# Patient Record
Sex: FEMALE | Race: WHITE
[De-identification: names, ages, dates, MRNs, and addresses within clinical notes are randomized per-mention and may not be internally consistent; named-entity substitution may affect disease eponyms.]

---

## 2017-05-15 ENCOUNTER — HOSPITAL ENCOUNTER (EMERGENCY)
Dept: HOSPITAL 62 - ER | Age: 27
Discharge: HOME | End: 2017-05-15
Payer: SELF-PAY

## 2017-05-15 VITALS — SYSTOLIC BLOOD PRESSURE: 122 MMHG | DIASTOLIC BLOOD PRESSURE: 82 MMHG

## 2017-05-15 DIAGNOSIS — F17.200: ICD-10-CM

## 2017-05-15 DIAGNOSIS — R11.2: Primary | ICD-10-CM

## 2017-05-15 DIAGNOSIS — R19.7: ICD-10-CM

## 2017-05-15 DIAGNOSIS — R10.9: ICD-10-CM

## 2017-05-15 LAB
ALBUMIN SERPL-MCNC: 4.4 G/DL (ref 3.5–5)
ALP SERPL-CCNC: 67 U/L (ref 38–126)
ALT SERPL-CCNC: 46 U/L (ref 9–52)
ANION GAP SERPL CALC-SCNC: 10 MMOL/L (ref 5–19)
APPEARANCE UR: (no result)
AST SERPL-CCNC: 26 U/L (ref 14–36)
BASOPHILS # BLD AUTO: 0 10^3/UL (ref 0–0.2)
BASOPHILS NFR BLD AUTO: 0.7 % (ref 0–2)
BILIRUB DIRECT SERPL-MCNC: 0.4 MG/DL (ref 0–0.4)
BILIRUB SERPL-MCNC: 0.9 MG/DL (ref 0.2–1.3)
BILIRUB UR QL STRIP: NEGATIVE
BUN SERPL-MCNC: 18 MG/DL (ref 7–20)
CALCIUM: 9.4 MG/DL (ref 8.4–10.2)
CHLORIDE SERPL-SCNC: 103 MMOL/L (ref 98–107)
CO2 SERPL-SCNC: 27 MMOL/L (ref 22–30)
CREAT SERPL-MCNC: 0.71 MG/DL (ref 0.52–1.25)
EOSINOPHIL # BLD AUTO: 0.3 10^3/UL (ref 0–0.6)
EOSINOPHIL NFR BLD AUTO: 6 % (ref 0–6)
ERYTHROCYTE [DISTWIDTH] IN BLOOD BY AUTOMATED COUNT: 12.9 % (ref 11.5–14)
GLUCOSE SERPL-MCNC: 141 MG/DL (ref 75–110)
GLUCOSE UR STRIP-MCNC: NEGATIVE MG/DL
HCT VFR BLD CALC: 43.5 % (ref 36–47)
HGB BLD-MCNC: 14.6 G/DL (ref 12–15.5)
HGB HCT DIFFERENCE: 0.3
KETONES UR STRIP-MCNC: NEGATIVE MG/DL
LIPASE SERPL-CCNC: 43.5 U/L (ref 23–300)
LYMPHOCYTES # BLD AUTO: 1.2 10^3/UL (ref 0.5–4.7)
LYMPHOCYTES NFR BLD AUTO: 26.8 % (ref 13–45)
MCH RBC QN AUTO: 31.2 PG (ref 27–33.4)
MCHC RBC AUTO-ENTMCNC: 33.5 G/DL (ref 32–36)
MCV RBC AUTO: 93 FL (ref 80–97)
MONOCYTES # BLD AUTO: 0.5 10^3/UL (ref 0.1–1.4)
MONOCYTES NFR BLD AUTO: 12.1 % (ref 3–13)
NEUTROPHILS # BLD AUTO: 2.5 10^3/UL (ref 1.7–8.2)
NEUTS SEG NFR BLD AUTO: 54.4 % (ref 42–78)
NITRITE UR QL STRIP: NEGATIVE
PH UR STRIP: 5 [PH] (ref 5–9)
POTASSIUM SERPL-SCNC: 3.9 MMOL/L (ref 3.6–5)
PROT SERPL-MCNC: 7.4 G/DL (ref 6.3–8.2)
PROT UR STRIP-MCNC: 30 MG/DL
RBC # BLD AUTO: 4.67 10^6/UL (ref 3.72–5.28)
SODIUM SERPL-SCNC: 140 MMOL/L (ref 137–145)
SP GR UR STRIP: 1.03
UROBILINOGEN UR-MCNC: NEGATIVE MG/DL (ref ?–2)
WBC # BLD AUTO: 4.5 10^3/UL (ref 4–10.5)

## 2017-05-15 PROCEDURE — 83690 ASSAY OF LIPASE: CPT

## 2017-05-15 PROCEDURE — S0119 ONDANSETRON 4 MG: HCPCS

## 2017-05-15 PROCEDURE — 85025 COMPLETE CBC W/AUTO DIFF WBC: CPT

## 2017-05-15 PROCEDURE — 81001 URINALYSIS AUTO W/SCOPE: CPT

## 2017-05-15 PROCEDURE — 80053 COMPREHEN METABOLIC PANEL: CPT

## 2017-05-15 PROCEDURE — 99284 EMERGENCY DEPT VISIT MOD MDM: CPT

## 2017-05-15 PROCEDURE — 87086 URINE CULTURE/COLONY COUNT: CPT

## 2017-05-15 PROCEDURE — 84703 CHORIONIC GONADOTROPIN ASSAY: CPT

## 2017-05-15 PROCEDURE — 36415 COLL VENOUS BLD VENIPUNCTURE: CPT

## 2017-05-15 NOTE — ER DOCUMENT REPORT
ED GI/





- General


Chief Complaint: Nausea/Vomiting/Diarrhea


Stated Complaint: ABDOMINAL PAIN/VOMITING


Time Seen by Provider: 05/15/17 08:06


Mode of Arrival: Ambulatory


Information source: Patient


Notes: 


26-year-old female with nausea vomiting and diarrhea since Saturday.  She feels 

dehydrated and vomited at work so they sent her home.   No fever or chills.  

Generalized mild abdominal pain and no focal tenderness at this time.  No 

history of Crohn's, colitis, diverticulitis.  Denies pregnancy.


TRAVEL OUTSIDE OF THE [place] IN LAST 30 DAYS: No





- Related Data


Allergies/Adverse Reactions: 


 





No Known Allergies Allergy (Unverified 05/15/17 07:48)


 











Past Medical History





- General


Information source: Patient





- Social History


Smoking Status: Current Every Day Smoker


Chew tobacco use (# tins/day): No


Frequency of alcohol use: Occasional


Drug Abuse: None


Lives with: Family


Family History: Reviewed & Not Pertinent


Patient has suicidal ideation: No


Patient has homicidal ideation: No





- Medical History


Medical History: Negative


Renal/ Medical History: Denies: Hx Peritoneal Dialysis


Surgical Hx: Negative





Review of Systems





- Review of Systems


Constitutional: No symptoms reported


EENT: No symptoms reported


Cardiovascular: No symptoms reported


Respiratory: No symptoms reported


Gastrointestinal: See HPI


Genitourinary: No symptoms reported


Female Genitourinary: No symptoms reported


Musculoskeletal: No symptoms reported


Skin: No symptoms reported


Hematologic/Lymphatic: No symptoms reported


Neurological/Psychological: No symptoms reported





Physical Exam





- Vital signs


Vitals: 


 











Temp Pulse Resp BP Pulse Ox


 


 97.7 F   97   16   137/77 H  97 


 


 05/15/17 07:48  05/15/17 07:48  05/15/17 07:48  05/15/17 07:48  05/15/17 07:48











Interpretation: Normal





- General


General appearance: Appears well, Alert


In distress: None





- HEENT


Head: Normocephalic, Atraumatic


Eyes: Normal


Pupils: PERRL


Mucous membranes: Dry


Neck: Supple.  No: Lymphadenopathy





- Respiratory


Respiratory status: No respiratory distress


Chest status: Nontender


Breath sounds: Normal


Chest palpation: Normal





- Cardiovascular


Rhythm: Regular


Heart sounds: Normal auscultation


Murmur: No





- Abdominal


Inspection: Normal


Distension: No distension


Bowel sounds: Normal


Tenderness: Nontender.  No: Tender


Organomegaly: No organomegaly





- Back


Back: Normal, Nontender.  No: CVA tenderness





- Extremities


General upper extremity: Normal inspection, Nontender, Normal color, Normal ROM

, Normal temperature


General lower extremity: Normal inspection, Nontender, Normal color, Normal ROM

, Normal temperature, Normal weight bearing.  No: Sapna's sign





- Neurological


Neuro grossly intact: Yes


Cognition: Normal


Orientation: AAOx4


Cliffwood Coma Scale Eye Opening: Spontaneous


[place] Coma Scale Verbal: Oriented


[place] Coma Scale Motor: Obeys Commands


[place] Coma Scale Total: 15


Speech: Normal


Motor strength normal: LUE, RUE, LLE, RLE


Sensory: Normal





- Psychological


Associated symptoms: Normal affect, Normal mood





- Skin


Skin Temperature: Warm


Skin Moisture: Dry


Skin Color: Normal


Skin irregularity: negative: Rash





Course





- Re-evaluation


Re-evalutation: 





05/15/17 19:01


Late entry: Patient felt much better after 2 L of fluid and she was able to 

drink fluids and eat crackers.  sHe wants to return to work on Wednesday.





- Vital Signs


Vital signs: 


 











Temp Pulse Resp BP Pulse Ox


 


 97.4 F   82   18   122/82   98 


 


 05/15/17 10:55  05/15/17 10:55  05/15/17 10:55  05/15/17 10:55  05/15/17 10:55














- Laboratory


Result Diagrams: 


 05/15/17 07:50





 05/15/17 07:50


Laboratory results interpreted by me: 


 











  05/15/17 05/15/17





  07:50 07:50


 


Glucose  141 H 


 


Urine Protein   30 H














Discharge





- Discharge


Clinical Impression: 


 vomiting and diarrhea





Condition: Good


Disposition: HOME, SELF-CARE


Instructions:  Antinausea Medication ([facility]), Intravenous (IV) Fluids ([facility]), 

Vomiting ([facility]), Diarrhea, Nonspecific ([facility])


Additional Instructions: 


Continue to hydrate and advance her diet as tolerated


Return to the emergency room if worse





Please complete the patient satisfaction survey if you get one, and return it.. 

If you do not receive a survey,  then you can go to the [facility] website, [email or web address] 

and place your comments about your very good care. Thank you very much. It was 

a pleasure being your medical provider today.


Forms:  Return to Work

## 2017-07-05 ENCOUNTER — HOSPITAL ENCOUNTER (EMERGENCY)
Dept: HOSPITAL 62 - ER | Age: 27
Discharge: HOME | End: 2017-07-05
Payer: SELF-PAY

## 2017-07-05 VITALS — SYSTOLIC BLOOD PRESSURE: 119 MMHG | DIASTOLIC BLOOD PRESSURE: 68 MMHG

## 2017-07-05 DIAGNOSIS — R22.0: Primary | ICD-10-CM

## 2017-07-05 PROCEDURE — 99283 EMERGENCY DEPT VISIT LOW MDM: CPT

## 2017-07-05 NOTE — ER DOCUMENT REPORT
HPI





- HPI


Patient complains to provider of: facial swelling


Pain Level: 5


Context: 


27 yo female c/o left facial swelling since last night.  denies new contacts or 

foods, no dental pain or gum swelling.  no facial pain but cheek feels numb.  

denies tongue swelling.  no difficulty swallowing, talking or breathing


Associated Symptoms: None


Exacerbated by: Denies


Relieved by: Denies


Similar symptoms previously: No


Recently seen / treated by doctor: No





- ROS


Systems Reviewed and Negative: Yes All other systems reviewed and negative





- DERM


Skin Color: Normal





Past Medical History





- General


Information source: Patient





- Social History


Smoking Status: Never Smoker


Frequency of alcohol use: None


Drug Abuse: None


Lives with: Family


Family History: Reviewed & Not Pertinent


Patient has suicidal ideation: No


Patient has homicidal ideation: No





- Medical History


Medical History: Negative


Renal/ Medical History: Denies: Hx Peritoneal Dialysis





Vertical Provider Document





- CONSTITUTIONAL


Agree With Documented VS: Yes


Exam Limitations: No Limitations





- INFECTION CONTROL


TRAVEL OUTSIDE OF THE [place] IN LAST 30 DAYS: No





- HEENT


HEENT: Atraumatic, PERRLA


Notes: 


+ soft tissue swelling to left malar face.  no angioedema, no gingival edema or 

pain





- NECK


Neck: Normal Inspection, Supple





- RESPIRATORY


O2 Sat by Pulse Oximetry: 97





- CARDIOVASCULAR


Cardiovascular: Regular Rate, Regular Rhythm





- MUSCULOSKELETAL/EXTREMETIES


Musculoskeletal/Extremeties: MAEW, FROM, Non-Tender





- NEURO


Level of Consciousness: Awake, Alert, Appropriate





- DERM


Integumentary: Warm, Dry, No Rash





Course





- Re-evaluation


Re-evalutation: 





07/05/17 13:50


swelling looks more consistant with allergic reaction than infectious cause.  

will treat with steroid, antihistamines.  pt instructed to return to ER for any 

worsening.  agreeable with plan and stable for discharge





- Vital Signs


Vital signs: 


 











Temp Pulse Resp BP Pulse Ox


 


 98.5 F   73   14   129/68 H  97 


 


 07/05/17 12:20  07/05/17 12:20  07/05/17 12:20  07/05/17 12:20  07/05/17 12:20














Discharge





- Discharge


Clinical Impression: 


 Facial swelling





Condition: Stable


Disposition: HOME, SELF-CARE


Instructions:  Acute Allergic Reaction ([facility]), Steroid Medication, OTC 

Antihistamines ([facility]), Use of Diphenhydramine


Additional Instructions: 


I am treating your symptoms for an allergic reaction


please take medications as prescribed


take OTC Benadryl 50mg every 6h for 24h after symptoms resolve


take OTC Pepcid twice a day for 24h after symptoms resolve


return to ER for any worsening


Prescriptions: 


Prednisone [Deltasone 20 mg Tablet] 3 tab PO DAILY 5 Days


Forms:  Return to Work

## 2018-05-06 ENCOUNTER — HOSPITAL ENCOUNTER (INPATIENT)
Dept: HOSPITAL 62 - ER | Age: 28
LOS: 2 days | Discharge: HOME | DRG: 603 | End: 2018-05-08
Attending: INTERNAL MEDICINE | Admitting: INTERNAL MEDICINE
Payer: SELF-PAY

## 2018-05-06 DIAGNOSIS — R22.32: ICD-10-CM
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
## 2018-05-06 DIAGNOSIS — F17.210: ICD-10-CM

## 2018-05-06 DIAGNOSIS — S50.872A: ICD-10-CM
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
## 2018-05-06 DIAGNOSIS — L03.114: Primary | ICD-10-CM

## 2018-05-06 LAB
ADD MANUAL DIFF: NO
ALBUMIN SERPL-MCNC: 4.6 G/DL (ref 3.5–5)
ALP SERPL-CCNC: 66 U/L (ref 38–126)
ALT SERPL-CCNC: 38 U/L (ref 9–52)
ANION GAP SERPL CALC-SCNC: 15 MMOL/L (ref 5–19)
AST SERPL-CCNC: 21 U/L (ref 14–36)
BASOPHILS # BLD AUTO: 0.1 10^3/UL (ref 0–0.2)
BASOPHILS NFR BLD AUTO: 0.4 % (ref 0–2)
BILIRUB DIRECT SERPL-MCNC: 0.3 MG/DL (ref 0–0.4)
BILIRUB SERPL-MCNC: 0.7 MG/DL (ref 0.2–1.3)
BUN SERPL-MCNC: 11 MG/DL (ref 7–20)
CALCIUM: 9.8 MG/DL (ref 8.4–10.2)
CHLORIDE SERPL-SCNC: 104 MMOL/L (ref 98–107)
CO2 SERPL-SCNC: 27 MMOL/L (ref 22–30)
EOSINOPHIL # BLD AUTO: 0.2 10^3/UL (ref 0–0.6)
EOSINOPHIL NFR BLD AUTO: 1.2 % (ref 0–6)
ERYTHROCYTE [DISTWIDTH] IN BLOOD BY AUTOMATED COUNT: 13.1 % (ref 11.5–14)
GLUCOSE SERPL-MCNC: 115 MG/DL (ref 75–110)
HCT VFR BLD CALC: 42.3 % (ref 36–47)
HGB BLD-MCNC: 14.1 G/DL (ref 12–15.5)
LYMPHOCYTES # BLD AUTO: 2.3 10^3/UL (ref 0.5–4.7)
LYMPHOCYTES NFR BLD AUTO: 16.8 % (ref 13–45)
MCH RBC QN AUTO: 30.8 PG (ref 27–33.4)
MCHC RBC AUTO-ENTMCNC: 33.4 G/DL (ref 32–36)
MCV RBC AUTO: 92 FL (ref 80–97)
MONOCYTES # BLD AUTO: 0.8 10^3/UL (ref 0.1–1.4)
MONOCYTES NFR BLD AUTO: 6 % (ref 3–13)
NEUTROPHILS # BLD AUTO: 10.2 10^3/UL (ref 1.7–8.2)
NEUTS SEG NFR BLD AUTO: 75.6 % (ref 42–78)
PLATELET # BLD: 217 10^3/UL (ref 150–450)
POTASSIUM SERPL-SCNC: 3.4 MMOL/L (ref 3.6–5)
PROT SERPL-MCNC: 7.8 G/DL (ref 6.3–8.2)
RBC # BLD AUTO: 4.59 10^6/UL (ref 3.72–5.28)
SODIUM SERPL-SCNC: 146.4 MMOL/L (ref 137–145)
TOTAL CELLS COUNTED % (AUTO): 100 %
WBC # BLD AUTO: 13.5 10^3/UL (ref 4–10.5)

## 2018-05-06 PROCEDURE — 85025 COMPLETE CBC W/AUTO DIFF WBC: CPT

## 2018-05-06 PROCEDURE — 90471 IMMUNIZATION ADMIN: CPT

## 2018-05-06 PROCEDURE — 84703 CHORIONIC GONADOTROPIN ASSAY: CPT

## 2018-05-06 PROCEDURE — 80048 BASIC METABOLIC PNL TOTAL CA: CPT

## 2018-05-06 PROCEDURE — 90715 TDAP VACCINE 7 YRS/> IM: CPT

## 2018-05-06 PROCEDURE — 99284 EMERGENCY DEPT VISIT MOD MDM: CPT

## 2018-05-06 PROCEDURE — 96375 TX/PRO/DX INJ NEW DRUG ADDON: CPT

## 2018-05-06 PROCEDURE — 87040 BLOOD CULTURE FOR BACTERIA: CPT

## 2018-05-06 PROCEDURE — 80053 COMPREHEN METABOLIC PANEL: CPT

## 2018-05-06 PROCEDURE — 96365 THER/PROPH/DIAG IV INF INIT: CPT

## 2018-05-06 PROCEDURE — 36415 COLL VENOUS BLD VENIPUNCTURE: CPT

## 2018-05-06 RX ADMIN — FENTANYL CITRATE PRN MCG: 50 INJECTION INTRAMUSCULAR; INTRAVENOUS at 22:07

## 2018-05-06 NOTE — ER DOCUMENT REPORT
ED Medical Screen (RME)





- General


Chief Complaint: Cat Bite


Stated Complaint: CAT BITE/LEFT ARM PAIN, SWELLING


Time Seen by Provider: 05/06/18 17:15


Notes: 





27-year-old female patient was holding her friends Yesterday afternoon when her 

dog saw the cat and tried to get the cat.  The cat freaked out and bit the 

patient on the left dorsal distal forearm several times.  Today the entire area 

is red and swollen and quite painful.





I have greeted and performed a rapid initial assessment of this patient.  A 

comprehensive ED assessment and evaluation of the patient, analysis of test 

results and completion of the medical decision making process will be conducted 

by additional ED providers.


TRAVEL OUTSIDE OF THE [place] IN LAST 30 DAYS: No





- Related Data


Allergies/Adverse Reactions: 


 





No Known Allergies Allergy (Verified 05/06/18 16:55)


 











Past Medical History


Renal/ Medical History: Denies: Hx Peritoneal Dialysis





Physical Exam





- Vital signs


Vitals: 





 











Temp Pulse Resp BP Pulse Ox


 


 98.2 F   94   20   122/67   100 


 


 05/06/18 17:01  05/06/18 17:01  05/06/18 17:01  05/06/18 17:01  05/06/18 17:01














Course





- Vital Signs


Vital signs: 





 











Temp Pulse Resp BP Pulse Ox


 


 98.2 F   94   20   122/67   100 


 


 05/06/18 17:01  05/06/18 17:01  05/06/18 17:01  05/06/18 17:01  05/06/18 17:01

## 2018-05-06 NOTE — ER DOCUMENT REPORT
ED General





- General


Chief Complaint: Cat Bite


Stated Complaint: CAT BITE/LEFT ARM PAIN, SWELLING


Time Seen by Provider: 05/06/18 17:15


Notes: 





Patient is a 27-year-old female without past medical history who presents with 

20-24 hours of progressively worsening erythema, pain and swelling of her left 

dorsal forearm.  The patient reports that she was bitten 3-4 times by a friend'

s cat yesterday afternoon when the cat saw her dog.  She states that she has 

since had a progressively worsening throbbing, aching, severe pain to the 

affected area and that she has noted a rapidly spreading erythema from the 

areas of the bites down to the dorsum of her hand and spreading towards her 

elbow.  She states any attempt at moving the arm worsens the pain.  She has 

tried over-the-counter pain medications with minimal to no relief of the pain. 

she has not had any associated fever although does report cold chills.  She has 

no history of similar symptoms in the past.  She has not seen her primary care 

doctor regarding today's concerns.


TRAVEL OUTSIDE OF THE [place] IN LAST 30 DAYS: No





- Related Data


Allergies/Adverse Reactions: 


 





No Known Allergies Allergy (Verified 05/06/18 16:55)


 











Past Medical History





- General


Information source: Patient





- Social History


Smoking Status: Current Every Day Smoker


Frequency of alcohol use: Rare


Drug Abuse: None


Lives with: Family


Family History: Reviewed & Not Pertinent


Patient has suicidal ideation: No


Patient has homicidal ideation: No


Renal/ Medical History: Denies: Hx Peritoneal Dialysis





Review of Systems





- Review of Systems


Notes: 





Constitutional: Negative for fever.


HENT: Negative for sore throat.


Eyes: Negative for visual changes.


Cardiovascular: Negative for chest pain.


Respiratory: Negative for shortness of breath.


Gastrointestinal: Negative for abdominal pain, vomiting or diarrhea.


Genitourinary: Negative for dysuria.


Musculoskeletal: Positive for left forearm pain


Skin: Positive for rash.


Neurological: Negative for headaches, weakness or numbness.





10 point ROS negative except as marked above and in HPI.





Physical Exam





- Vital signs


Vitals: 


 











Temp Pulse Resp BP Pulse Ox


 


 98.2 F   94   20   122/67   100 


 


 05/06/18 17:01  05/06/18 17:01  05/06/18 17:01  05/06/18 17:01  05/06/18 17:01











Notes: 





PHYSICAL EXAMINATION:





GENERAL: Appears moderately uncomfortable but in no acute distress





HEAD: Atraumatic, normocephalic.





EYES: Pupils equal round and reactive to light, extraocular movements intact, 

sclera anicteric, conjunctiva are normal.





ENT: nares patent, oropharynx clear without exudates.  Moist mucous membranes.





NECK: Normal range of motion, supple without lymphadenopathy





LUNGS: Breath sounds clear to auscultation bilaterally and equal.  No wheezes 

rales or rhonchi.





HEART: Regular rate and rhythm without murmurs





ABDOMEN: Soft, nontender, normoactive bowel sounds.  No guarding, no rebound.  

No masses appreciated.





EXTREMITIES: Normal range of motion, no pitting or edema.  No cyanosis.  Full 

flexion extension at the DIP, MCP and PIP of all digits of the left hand 

although obvious discomfort with this range of motion.  No pain along palpation 

of the flexor sheath.





NEUROLOGICAL: No focal neurological deficits. Moves all extremities 

spontaneously and on command.





PSYCH: Normal mood, normal affect.





SKIN: Warm, Dry, normal turgor, there are 4-5 puncture wounds that are scabbed 

over on the left midforearm with extensive surrounding induration and erythema 

without any areas of fluctuance.  The erythema extends over the entirety the 

dorsum of the hand approaches approximately two thirds of the dorsal area of 

the forearm.





Course





- Re-evaluation


Re-evalutation: 





05/06/18 18:34


Patient presents after a cat bit her left dorsal forearm 3-4 times yesterday 

now with an extensive cellulitis to approximately two thirds of the surface 

area of the dorsal aspect of the left forearm also extending onto the dorsum of 

the hand.  No evidence of flexor tenosynovitis on examination although the 

degree of cellulitis progression in less than 24 hours with associated 

extensive edema is extremely worrisome for rapidly progressing infection.  

Patient also has a marked amount of pain with this infection.  Her tetanus has 

been updated.  The cat is a known cat and is up-to-date on all immunizations 

including rabies vaccination.  Given the rapidity with which the cellulitis has 

spread and the source of the infection, I have discussed the patient 

hospitalization and she has agreed.  A dose of 3 mg of IV Unasyn has been 

administered.  Pain control with morphine and Toradol as needed.  Will discuss 

with the hospitalist for admission.





- Vital Signs


Vital signs: 


 











Temp Pulse Resp BP Pulse Ox


 


 98.2 F   94   20   122/67   100 


 


 05/06/18 17:01  05/06/18 17:01  05/06/18 17:01  05/06/18 17:01  05/06/18 17:01














- Laboratory


Result Diagrams: 


 05/06/18 17:48





 05/06/18 17:48


Laboratory results interpreted by me: 


 











  05/06/18 05/06/18





  17:48 17:48


 


WBC  13.5 H 


 


Absolute Neutrophils  10.2 H 


 


Sodium   146.4 H


 


Potassium   3.4 L


 


Glucose   115 H














Discharge





- Discharge


Clinical Impression: 


 Cellulitis of forearm, left





Cat bite of left forearm with infection


Qualifiers:


 Encounter type: initial encounter Qualified Code(s): S51.852A - Open bite of 

left forearm, initial encounter; L08.9 - Local infection of the skin and 

subcutaneous tissue, unspecified; L08.9 - Local infection of the skin and 

subcutaneous tissue, unspecified; W55.01XA - Bitten by cat, initial encounter; 

W55.01XA - Bitten by cat, initial encounter





Condition: Fair


Disposition: ADMITTED AS INPATIENT


Admitting Provider: Hospitalist


Unit Admitted: Medical Floor

## 2018-05-06 NOTE — PDOC H&P
History of Present Illness


Admission Date/PCP: 


  05/06/18 19:59





  





History of Present Illness: 


EDIS STEWART is a 27 year old  female patient who does not have 

significant medical history presented with 1 day history of swelling pain and 

tenderness of her left following bitten by a cat.  Patient denies any other 

constitutional symptoms.  No nausea, vomiting, abdominal pain, diarrhea or 

urinary complaints.  No headache, dizziness or blurry vision.  Her initial 

blood work shows mild leukocytosis.  Patient is given prophylaxis for tetanus 

at the ER.





Past Medical History


Medical History: None





Past Surgical History


Past Surgical History: Reports: None





Social History


Lives with: Family


Smoking Status: Current Every Day Smoker





- Advance Directive


Resuscitation Status: Full Code





Family History


Family History: Reviewed & Not Pertinent


Parental Family History Reviewed: Yes


Children Family History Reviewed: Yes


Sibling(s) Family History Reviewed.: Yes





Medication/Allergy


Home Medications: 








Prednisone [Deltasone 20 mg Tablet] 3 tab PO DAILY 5 Days  tablet 07/05/17 








Allergies/Adverse Reactions: 


 





No Known Allergies Allergy (Verified 05/06/18 16:55)


 











Review of Systems


Constitutional: PRESENT: as per HPI


Eyes: PRESENT: as per HPI


Ears: PRESENT: as per HPI


Cardiovascular: PRESENT: as per HPI


Gastrointestinal: PRESENT: as per HPI


Neurological: PRESENT: as per HPI





Physical Exam


Vital Signs: 


 











Temp Pulse Resp BP Pulse Ox


 


 98.5 F   78   20   123/68   100 


 


 05/06/18 20:22  05/06/18 20:22  05/06/18 17:01  05/06/18 20:22  05/06/18 20:22











General appearance: PRESENT: no acute distress


Head exam: PRESENT: atraumatic, normocephalic


Respiratory exam: PRESENT: clear to auscultation quiana.  ABSENT: rales, rhonchi, 

wheezes


Cardiovascular exam: PRESENT: RRR.  ABSENT: diastolic murmur, rubs, systolic 

murmur


GI/Abdominal exam: PRESENT: normal bowel sounds, soft.  ABSENT: distended, 

guarding, mass, organolmegaly, rebound, tenderness


Extremities exam: PRESENT: other - The pertinent finding is erythema, swelling, 

tenderness and bite marks on her left forearm.





Assessment & Plan





- Diagnosis


(1) Cat bite of left forearm with infection


Qualifiers: 


   Encounter type: initial encounter   Qualified Code(s): S51.852A - Open bite 

of left forearm, initial encounter; L08.9 - Local infection of the skin and 

subcutaneous tissue, unspecified; L08.9 - Local infection of the skin and 

subcutaneous tissue, unspecified; W55.01XA - Bitten by cat, initial encounter; 

W55.01XA - Bitten by cat, initial encounter   


Is this a current diagnosis for this admission?: Yes   


Plan: 


Patient given tetanus antitoxin








(2) Cellulitis of forearm, left


Is this a current diagnosis for this admission?: Yes   


Plan: 


Patient has been started on Unasyn.








- Time


Time Spent: 30 to 50 Minutes





- Inpatient Certification


Medical Necessity: Need for IV Antibiotics

## 2018-05-07 LAB
ADD MANUAL DIFF: NO
ANION GAP SERPL CALC-SCNC: 9 MMOL/L (ref 5–19)
BASOPHILS # BLD AUTO: 0.1 10^3/UL (ref 0–0.2)
BASOPHILS NFR BLD AUTO: 0.8 % (ref 0–2)
BUN SERPL-MCNC: 14 MG/DL (ref 7–20)
CALCIUM: 9 MG/DL (ref 8.4–10.2)
CHLORIDE SERPL-SCNC: 109 MMOL/L (ref 98–107)
CO2 SERPL-SCNC: 25 MMOL/L (ref 22–30)
EOSINOPHIL # BLD AUTO: 0.3 10^3/UL (ref 0–0.6)
EOSINOPHIL NFR BLD AUTO: 2.8 % (ref 0–6)
ERYTHROCYTE [DISTWIDTH] IN BLOOD BY AUTOMATED COUNT: 13 % (ref 11.5–14)
GLUCOSE SERPL-MCNC: 126 MG/DL (ref 75–110)
HCT VFR BLD CALC: 38.3 % (ref 36–47)
HGB BLD-MCNC: 12.9 G/DL (ref 12–15.5)
LYMPHOCYTES # BLD AUTO: 2.3 10^3/UL (ref 0.5–4.7)
LYMPHOCYTES NFR BLD AUTO: 24.4 % (ref 13–45)
MCH RBC QN AUTO: 30.9 PG (ref 27–33.4)
MCHC RBC AUTO-ENTMCNC: 33.7 G/DL (ref 32–36)
MCV RBC AUTO: 91 FL (ref 80–97)
MONOCYTES # BLD AUTO: 0.9 10^3/UL (ref 0.1–1.4)
MONOCYTES NFR BLD AUTO: 9.1 % (ref 3–13)
NEUTROPHILS # BLD AUTO: 6 10^3/UL (ref 1.7–8.2)
NEUTS SEG NFR BLD AUTO: 62.9 % (ref 42–78)
PLATELET # BLD: 188 10^3/UL (ref 150–450)
POTASSIUM SERPL-SCNC: 3.6 MMOL/L (ref 3.6–5)
RBC # BLD AUTO: 4.19 10^6/UL (ref 3.72–5.28)
SODIUM SERPL-SCNC: 142.6 MMOL/L (ref 137–145)
TOTAL CELLS COUNTED % (AUTO): 100 %
WBC # BLD AUTO: 9.6 10^3/UL (ref 4–10.5)

## 2018-05-07 RX ADMIN — IBUPROFEN PRN MG: 800 TABLET, FILM COATED ORAL at 21:27

## 2018-05-07 RX ADMIN — AMPICILLIN SODIUM AND SULBACTAM SODIUM SCH GM: 2; 1 INJECTION, POWDER, FOR SOLUTION INTRAMUSCULAR; INTRAVENOUS at 00:36

## 2018-05-07 RX ADMIN — AMPICILLIN SODIUM AND SULBACTAM SODIUM SCH GM: 2; 1 INJECTION, POWDER, FOR SOLUTION INTRAMUSCULAR; INTRAVENOUS at 06:21

## 2018-05-07 RX ADMIN — FENTANYL CITRATE PRN MCG: 50 INJECTION INTRAMUSCULAR; INTRAVENOUS at 06:29

## 2018-05-07 RX ADMIN — ACETAMINOPHEN PRN MG: 325 TABLET ORAL at 03:56

## 2018-05-07 RX ADMIN — AMPICILLIN SODIUM AND SULBACTAM SODIUM SCH GM: 2; 1 INJECTION, POWDER, FOR SOLUTION INTRAMUSCULAR; INTRAVENOUS at 23:13

## 2018-05-07 RX ADMIN — AMPICILLIN SODIUM AND SULBACTAM SODIUM SCH GM: 2; 1 INJECTION, POWDER, FOR SOLUTION INTRAMUSCULAR; INTRAVENOUS at 17:15

## 2018-05-07 RX ADMIN — AMPICILLIN SODIUM AND SULBACTAM SODIUM SCH GM: 2; 1 INJECTION, POWDER, FOR SOLUTION INTRAMUSCULAR; INTRAVENOUS at 13:02

## 2018-05-07 RX ADMIN — ACETAMINOPHEN PRN MG: 325 TABLET ORAL at 09:54

## 2018-05-07 RX ADMIN — IBUPROFEN PRN MG: 800 TABLET, FILM COATED ORAL at 13:02

## 2018-05-07 RX ADMIN — LANSOPRAZOLE SCH MG: 30 TABLET, ORALLY DISINTEGRATING, DELAYED RELEASE ORAL at 06:28

## 2018-05-07 RX ADMIN — ENOXAPARIN SODIUM SCH MG: 40 INJECTION SUBCUTANEOUS at 09:48

## 2018-05-07 NOTE — PDOC CONSULTATION
Consultation


Consult reason:: left forearm cat bite





History of Present Illness


Admission Date/PCP: 


  05/06/18 19:59





  





History of Present Illness: 


EDIS STEWART is a 27 year old female


Patient sustained By injury left forearm 3 days ago.  She is uncertain of the 

tetanus status.  Patient was seen in the emergency department, admitted to the 

medical service for intravenous antibiotics and arm elevation.  Patient states 

initially the arm is very tight she could not move her wrist but now has 

improved range of motion of the hand.  She remains on intravenous antibiotics; 

she is right-hand-dominant





Past Medical History


Past Medical History: 





Chronic smoker





Past Surgical History


Past Surgical History: Reports: None





Social History


Lives with: Family


Smoking Status: Current Every Day Smoker


Frequency of Alcohol Use: Occasional


Hx Recreational Drug Use: No


Drugs: None


Hx Prescription Drug Abuse: No





- Advance Directive


Resuscitation Status: Full Code





Family History


Family History: Reviewed & Not Pertinent


Parental Family History Reviewed: Yes


Children Family History Reviewed: Yes


Sibling(s) Family History Reviewed.: Yes





Medication/Allergy


Home Medications: 








No Home Medications  05/07/18 








Allergies/Adverse Reactions: 


 





No Known Allergies Allergy (Verified 05/06/18 16:55)


 











Review of Systems


Eyes: ABSENT: visual disturbances


Ears: ABSENT: hearing changes


Cardiovascular: ABSENT: chest pain, dyspnea on exertion, edema, orthropnea, 

palpitations


Respiratory: ABSENT: cough, hemoptysis


Gastrointestinal: ABSENT: abdominal pain, constipation, diarrhea, hematemesis, 

hematochezia, nausea, vomiting


Musculoskeletal: PRESENT: as per HPI





Physical Exam


Vital Signs: 


 











Temp Pulse Resp BP Pulse Ox


 


 97.9 F   68   20   103/52 L  98 


 


 05/07/18 11:10  05/07/18 11:10  05/07/18 11:10  05/07/18 11:10  05/07/18 11:10








 Intake & Output











 05/06/18 05/07/18 05/08/18





 06:59 06:59 06:59


 


Intake Total  520 


 


Output Total  100 


 


Balance  420 


 


Weight  79.8 kg 











General appearance: PRESENT: no acute distress


Head exam: PRESENT: normocephalic


Mouth exam: PRESENT: dry mucosa


Neck exam: PRESENT: full ROM


Respiratory exam: PRESENT: clear to auscultation quiana


Cardiovascular exam: PRESENT: RRR


Pulses: PRESENT: normal radial pulses, normal femoral pulses


GI/Abdominal exam: PRESENT: normal bowel sounds


Rectal exam: PRESENT: deferred


Extremities exam: PRESENT: other - Left upper extremity examined.  For punctate

, healing her wounds to the left forearm dorsal surface, no foul smell drainage

; minimal tenderness to the distal left forearm, extensor retinaculum and the 

left hand.  Range of motion of the fingers and hand well-preserved.  Patient 

has some  pain on wrist extension.


Skin exam: PRESENT: other - No cervical adenopathy





Results


Laboratory Results: 


 





 05/07/18 06:10 





 05/07/18 06:10 





 











  05/07/18 05/07/18





  06:10 06:10


 


WBC  9.6 


 


RBC  4.19 


 


Hgb  12.9 


 


Hct  38.3 


 


MCV  91 


 


MCH  30.9 


 


MCHC  33.7 


 


RDW  13.0 


 


Plt Count  188 


 


Seg Neutrophils %  62.9 


 


Lymphocytes %  24.4 


 


Monocytes %  9.1 


 


Eosinophils %  2.8 


 


Basophils %  0.8 


 


Absolute Neutrophils  6.0 


 


Absolute Lymphocytes  2.3 


 


Absolute Monocytes  0.9 


 


Absolute Eosinophils  0.3 


 


Absolute Basophils  0.1 


 


Sodium   142.6


 


Potassium   3.6


 


Chloride   109 H


 


Carbon Dioxide   25


 


Anion Gap   9


 


BUN   14


 


Creatinine   0.63


 


Est GFR ( Amer)   > 60


 


Est GFR (Non-Af Amer)   > 60


 


Glucose   126 H


 


Calcium   9.0














Assessment & Plan





- Diagnosis


(1) Cellulitis of forearm, left


Is this a current diagnosis for this admission?: Yes   


Plan: 


Self-reported cat bite left forearm and right hand dominant female with 

primarily local contamination; clinically significant for infection.  

Clinically improved, range of motion the left hand and forearm nearly intact








Indications:











1.  Aggressive physical therapy; this was discussed with patient





2.  Elevation left forearm while at rest





3.  Can safely switch to p.o. antibiotics








- Time


Time Spent: 30 to 50 Minutes


Smoking Cessation Education: 3 to 10 minutes


Anticipated discharge: Home





- Inpatient Certification


Based on my medical assessment, after consideration of the patient's 

comorbidities, presenting symptoms, or acuity I expect that the services needed 

warrant INPATIENT care.: Yes


I certify that my determination is in accordance with my understanding of 

Medicare's requirements for reasonable and necessary INPATIENT services [42 CFR 

412.3e].: Yes


Medical Necessity: Need for Pain Control

## 2018-05-07 NOTE — PDOC PROGRESS REPORT
Subjective


Progress Note for:: 05/07/18


Subjective:: 





Patient admitted with cellulitis of the left upper extremity after being bitten 

by a cat.


She was started on Unasyn.  Patient tells me that he does not really look much 

better today.  


She denies any nausea vomiting other pertinent symptoms.


Reason For Visit: 


CELLULITIS, CAT SCRATCH








Physical Exam


Vital Signs: 


 











Temp Pulse Resp BP Pulse Ox


 


 97.9 F   67   20   97/52 L  100 


 


 05/07/18 07:30  05/07/18 07:30  05/07/18 07:30  05/07/18 07:30  05/07/18 07:30








 Intake & Output











 05/06/18 05/07/18 05/08/18





 06:59 06:59 06:59


 


Intake Total  520 


 


Output Total  100 


 


Balance  420 


 


Weight  79.8 kg 











General appearance: PRESENT: no acute distress


Head exam: PRESENT: atraumatic


Eye exam: PRESENT: conjunctiva pink, EOMI, PERRLA.  ABSENT: scleral icterus


Ear exam: PRESENT: normal external ear exam


Neck exam: ABSENT: carotid bruit, JVD, lymphadenopathy, thyromegaly


Respiratory exam: PRESENT: clear to auscultation quiana.  ABSENT: rales, rhonchi, 

wheezes


Cardiovascular exam: PRESENT: RRR.  ABSENT: diastolic murmur, rubs, systolic 

murmur


Pulses: PRESENT: normal dorsalis pedis pul


GI/Abdominal exam: PRESENT: normal bowel sounds, soft.  ABSENT: distended, 

guarding, mass, organolmegaly, rebound, tenderness


Rectal exam: PRESENT: deferred


Extremities exam: PRESENT: full ROM - slightly diminished L wrist, joint 

swelling - L wrist, tenderness


Neurological exam: PRESENT: alert, awake, oriented to person, oriented to place

, oriented to time, oriented to situation, CN II-XII grossly intact.  ABSENT: 

motor sensory deficit





Results


Laboratory Results: 


 





 05/07/18 06:10 





 05/07/18 06:10 





 











  05/07/18 05/07/18





  06:10 06:10


 


WBC  9.6 


 


RBC  4.19 


 


Hgb  12.9 


 


Hct  38.3 


 


MCV  91 


 


MCH  30.9 


 


MCHC  33.7 


 


RDW  13.0 


 


Plt Count  188 


 


Seg Neutrophils %  62.9 


 


Lymphocytes %  24.4 


 


Monocytes %  9.1 


 


Eosinophils %  2.8 


 


Basophils %  0.8 


 


Absolute Neutrophils  6.0 


 


Absolute Lymphocytes  2.3 


 


Absolute Monocytes  0.9 


 


Absolute Eosinophils  0.3 


 


Absolute Basophils  0.1 


 


Sodium   142.6


 


Potassium   3.6


 


Chloride   109 H


 


Carbon Dioxide   25


 


Anion Gap   9


 


BUN   14


 


Creatinine   0.63


 


Est GFR ( Amer)   > 60


 


Est GFR (Non-Af Amer)   > 60


 


Glucose   126 H


 


Calcium   9.0














Assessment & Plan





- Diagnosis


(1) Cat bite of left forearm with infection


Qualifiers: 


   Encounter type: initial encounter   Qualified Code(s): S51.852A - Open bite 

of left forearm, initial encounter; L08.9 - Local infection of the skin and 

subcutaneous tissue, unspecified; L08.9 - Local infection of the skin and 

subcutaneous tissue, unspecified; W55.01XA - Bitten by cat, initial encounter; 

W55.01XA - Bitten by cat, initial encounter   


Is this a current diagnosis for this admission?: Yes   


Plan: 


Will consult Orthopedics for further evaluation








(2) Cellulitis of forearm, left


Is this a current diagnosis for this admission?: Yes   





- Time


Time Spent with patient: 15-24 minutes


Medications reviewed and adjusted accordingly: Yes


Anticipated discharge: Home


Within: within 72 hours





- Inpatient Certification


Based on my medical assessment, after consideration of the patient's 

comorbidities, presenting symptoms, or acuity I expect that the services needed 

warrant INPATIENT care.: Yes


Medical Necessity: Need for IV Antibiotics

## 2018-05-08 VITALS — SYSTOLIC BLOOD PRESSURE: 98 MMHG | DIASTOLIC BLOOD PRESSURE: 46 MMHG

## 2018-05-08 LAB
ADD MANUAL DIFF: NO
ANION GAP SERPL CALC-SCNC: 10 MMOL/L (ref 5–19)
BASOPHILS # BLD AUTO: 0.1 10^3/UL (ref 0–0.2)
BASOPHILS NFR BLD AUTO: 1.2 % (ref 0–2)
BUN SERPL-MCNC: 8 MG/DL (ref 7–20)
CALCIUM: 9.5 MG/DL (ref 8.4–10.2)
CHLORIDE SERPL-SCNC: 109 MMOL/L (ref 98–107)
CO2 SERPL-SCNC: 25 MMOL/L (ref 22–30)
EOSINOPHIL # BLD AUTO: 0.4 10^3/UL (ref 0–0.6)
EOSINOPHIL NFR BLD AUTO: 5.2 % (ref 0–6)
ERYTHROCYTE [DISTWIDTH] IN BLOOD BY AUTOMATED COUNT: 13.1 % (ref 11.5–14)
GLUCOSE SERPL-MCNC: 133 MG/DL (ref 75–110)
HCT VFR BLD CALC: 39.9 % (ref 36–47)
HGB BLD-MCNC: 13.4 G/DL (ref 12–15.5)
LYMPHOCYTES # BLD AUTO: 2.2 10^3/UL (ref 0.5–4.7)
LYMPHOCYTES NFR BLD AUTO: 31.4 % (ref 13–45)
MCH RBC QN AUTO: 30.6 PG (ref 27–33.4)
MCHC RBC AUTO-ENTMCNC: 33.7 G/DL (ref 32–36)
MCV RBC AUTO: 91 FL (ref 80–97)
MONOCYTES # BLD AUTO: 0.6 10^3/UL (ref 0.1–1.4)
MONOCYTES NFR BLD AUTO: 8.7 % (ref 3–13)
NEUTROPHILS # BLD AUTO: 3.8 10^3/UL (ref 1.7–8.2)
NEUTS SEG NFR BLD AUTO: 53.5 % (ref 42–78)
PLATELET # BLD: 182 10^3/UL (ref 150–450)
POTASSIUM SERPL-SCNC: 4.7 MMOL/L (ref 3.6–5)
RBC # BLD AUTO: 4.38 10^6/UL (ref 3.72–5.28)
SODIUM SERPL-SCNC: 144.4 MMOL/L (ref 137–145)
TOTAL CELLS COUNTED % (AUTO): 100 %
WBC # BLD AUTO: 7.1 10^3/UL (ref 4–10.5)

## 2018-05-08 RX ADMIN — AMPICILLIN SODIUM AND SULBACTAM SODIUM SCH GM: 2; 1 INJECTION, POWDER, FOR SOLUTION INTRAMUSCULAR; INTRAVENOUS at 11:14

## 2018-05-08 RX ADMIN — AMPICILLIN SODIUM AND SULBACTAM SODIUM SCH GM: 2; 1 INJECTION, POWDER, FOR SOLUTION INTRAMUSCULAR; INTRAVENOUS at 06:13

## 2018-05-08 RX ADMIN — LANSOPRAZOLE SCH MG: 30 TABLET, ORALLY DISINTEGRATING, DELAYED RELEASE ORAL at 06:12

## 2018-05-08 RX ADMIN — ENOXAPARIN SODIUM SCH MG: 40 INJECTION SUBCUTANEOUS at 09:11

## 2018-05-08 NOTE — PDOC DISCHARGE SUMMARY
General





- Admit/Disc Date/PCP


Admission Date/Primary Care Provider: 


  05/06/18 19:59





  





Discharge Date: 05/08/18





- Discharge Diagnosis


(1) Cat bite of left forearm with infection


Is this a current diagnosis for this admission?: Yes   





(2) Cellulitis of forearm, left


Is this a current diagnosis for this admission?: Yes   





- Additional Information


Resuscitation Status: Full Code


Discharge Diet: Regular


Discharge Activity: Activity As Tolerated, Other


Prescriptions: 


Ibuprofen [Motrin 800 mg Tablet] 800 mg PO Q8HP PRN #30 tablet


 PRN Reason: 


Amox Tr/Potassium Clavulanate [Augmentin 875-125 mg Tablet] 1 tab PO BID #14 

tablet


Home Medications: 








Amox Tr/Potassium Clavulanate [Augmentin 875-125 mg Tablet] 1 tab PO BID #14 

tablet 05/08/18 


Ibuprofen [Motrin 800 mg Tablet] 800 mg PO Q8HP PRN #30 tablet 05/08/18 











History of Present Illness


History of Present Illness: 


EDIS STEWART is a 27 year old female


who does not have significant medical history presented with 1 day history of 

swelling pain and tenderness of her left following bitten by a cat.  Patient 

denies any other constitutional symptoms.  No nausea, vomiting, abdominal pain, 

diarrhea or urinary complaints.  No headache, dizziness or blurry vision.  Her 

initial blood work shows mild leukocytosis.  Patient is given prophylaxis for 

tetanus at the ER.





Hospital Course


Hospital Course: 





27-year-old female was admitted with a cat bite of left upper extremity.  She 

was found to have cellulitis and was treated with intravenous Unasyn in 

hospital.  She was subsequently seen by general surgery with no was no need for 

any intervention.  Patient symptoms improved with improvement of the swelling 

and with hemodynamic stability.  With no further interventions been planned 

patient is been discharged home for outpatient follow-up.





Physical Exam


Vital Signs: 


 











Temp Pulse Resp BP Pulse Ox


 


 97.7 F   59 L  16   98/46 L  100 


 


 05/08/18 10:57  05/08/18 10:57  05/08/18 10:57  05/08/18 10:57  05/08/18 10:57








 Intake & Output











 05/07/18 05/08/18 05/09/18





 06:59 06:59 06:59


 


Intake Total 520 5140 458


 


Output Total 100  


 


Balance 420 5140 458


 


Weight 79.8 kg 83.6 kg 











General appearance: PRESENT: no acute distress


Head exam: PRESENT: atraumatic


Ear exam: PRESENT: normal external ear exam


Neck exam: ABSENT: carotid bruit, JVD, lymphadenopathy, thyromegaly


Respiratory exam: PRESENT: clear to auscultation quiana.  ABSENT: rales, rhonchi, 

wheezes


Cardiovascular exam: PRESENT: RRR.  ABSENT: diastolic murmur, rubs, systolic 

murmur


GI/Abdominal exam: PRESENT: normal bowel sounds, soft.  ABSENT: distended, 

guarding, mass, organolmegaly, rebound, tenderness


Musculoskeletal exam: PRESENT: ambulatory, full ROM, other - L upper extremity 

just distal to wrist with swelling and cat bite marks but no drainage or 

erythema


Neurological exam: PRESENT: alert, awake, oriented to person, oriented to place

, oriented to time, oriented to situation, CN II-XII grossly intact.  ABSENT: 

motor sensory deficit


Psychiatric exam: PRESENT: appropriate affect, normal mood.  ABSENT: homicidal 

ideation, suicidal ideation





Results


Laboratory Results: 


 





 05/08/18 05:23 





 05/08/18 05:23 





 











  05/08/18 05/08/18





  05:23 05:23


 


WBC  7.1 


 


RBC  4.38 


 


Hgb  13.4 


 


Hct  39.9 


 


MCV  91 


 


MCH  30.6 


 


MCHC  33.7 


 


RDW  13.1 


 


Plt Count  182 


 


Seg Neutrophils %  53.5 


 


Lymphocytes %  31.4 


 


Monocytes %  8.7 


 


Eosinophils %  5.2 


 


Basophils %  1.2 


 


Absolute Neutrophils  3.8 


 


Absolute Lymphocytes  2.2 


 


Absolute Monocytes  0.6 


 


Absolute Eosinophils  0.4 


 


Absolute Basophils  0.1 


 


Sodium   144.4


 


Potassium   4.7  D


 


Chloride   109 H


 


Carbon Dioxide   25


 


Anion Gap   10


 


BUN   8


 


Creatinine   0.62


 


Est GFR ( Amer)   > 60


 


Est GFR (Non-Af Amer)   > 60


 


Glucose   133 H


 


Calcium   9.5














Qualifiers





- *


PATIENT BEING DISCHARGED WITH ANY OF THE FOLLOWING DIAGNOSIS: No





Plan


Time Spent: Less than 30 Minutes

## 2018-05-08 NOTE — PDOC DISCHARGE SUMMARY
General





- Admit/Disc Date/PCP


Admission Date/Primary Care Provider: 


  05/06/18 19:59





  





Discharge Date: 05/08/18





- Discharge Diagnosis


(1) Cat bite of left forearm with infection


Is this a current diagnosis for this admission?: Yes   





(2) Cellulitis of forearm, left


Is this a current diagnosis for this admission?: Yes   





- Additional Information


Resuscitation Status: Full Code


Discharge Diet: Regular


Discharge Activity: Activity As Tolerated, Other - Elevate Lrft hand


Prescriptions: 


Ibuprofen [Motrin 800 mg Tablet] 800 mg PO Q8HP PRN #30 tablet


 PRN Reason: 


Amox Tr/Potassium Clavulanate [Augmentin 875-125 mg Tablet] 1 tab PO BID #14 

tablet


Home Medications: 








Amox Tr/Potassium Clavulanate [Augmentin 875-125 mg Tablet] 1 tab PO BID #14 

tablet 05/08/18 


Ibuprofen [Motrin 800 mg Tablet] 800 mg PO Q8HP PRN #30 tablet 05/08/18 











History of Present Illness


History of Present Illness: 


EDIS STEWART is a 27 year old female


admitted with swelling and pain LUE following a cat bite





Physical Exam


Vital Signs: 


 











Temp Pulse Resp BP Pulse Ox


 


 97.7 F   59 L  16   92/47 L  100 


 


 05/08/18 03:47  05/08/18 03:47  05/08/18 03:47  05/08/18 03:47  05/08/18 03:47








 Intake & Output











 05/07/18 05/08/18 05/09/18





 06:59 06:59 06:59


 


Intake Total 520 5140 


 


Output Total 100  


 


Balance 420 5140 


 


Weight 79.8 kg 83.6 kg 











General appearance: PRESENT: no acute distress, well-developed, well-nourished


Eye exam: PRESENT: conjunctiva pink, EOMI, PERRLA.  ABSENT: scleral icterus


Ear exam: PRESENT: normal external ear exam


GI/Abdominal exam: PRESENT: normal bowel sounds, soft.  ABSENT: distended, 

guarding, mass, organolmegaly, rebound, tenderness


Rectal exam: PRESENT: deferred


Extremities exam: PRESENT: full ROM, joint swelling - L wrist, other - L UE 

swelling but improved


Neurological exam: PRESENT: alert, awake, oriented to person, oriented to place

, oriented to time, oriented to situation, CN II-XII grossly intact.  ABSENT: 

motor sensory deficit





Results


Laboratory Results: 


 





 05/08/18 05:23 





 05/08/18 05:23 





 











  05/08/18 05/08/18





  05:23 05:23


 


WBC  7.1 


 


RBC  4.38 


 


Hgb  13.4 


 


Hct  39.9 


 


MCV  91 


 


MCH  30.6 


 


MCHC  33.7 


 


RDW  13.1 


 


Plt Count  182 


 


Seg Neutrophils %  53.5 


 


Lymphocytes %  31.4 


 


Monocytes %  8.7 


 


Eosinophils %  5.2 


 


Basophils %  1.2 


 


Absolute Neutrophils  3.8 


 


Absolute Lymphocytes  2.2 


 


Absolute Monocytes  0.6 


 


Absolute Eosinophils  0.4 


 


Absolute Basophils  0.1 


 


Sodium   144.4


 


Potassium   4.7  D


 


Chloride   109 H


 


Carbon Dioxide   25


 


Anion Gap   10


 


BUN   8


 


Creatinine   0.62


 


Est GFR ( Amer)   > 60


 


Est GFR (Non-Af Amer)   > 60


 


Glucose   133 H


 


Calcium   9.5














Qualifiers





- *


PATIENT BEING DISCHARGED WITH ANY OF THE FOLLOWING DIAGNOSIS: No





Plan


Time Spent: Less than 30 Minutes